# Patient Record
(demographics unavailable — no encounter records)

---

## 2022-07-31 LAB
INFLUENZA A: NOT DETECTED
INFLUENZA B: NOT DETECTED
SARS-COV-2: NOT DETECTED

## 2022-10-17 NOTE — DISCHARGE SUMMARY
ED Clinical Summary                         [facility] RESIDENTIAL TREATMENT FACILITY  [address]  [place], [place], [address] [phone]           PERSON INFORMATION  Name: Lyla Naranjo Age:  28 Years : 1987   Sex: Male Language: English PCP: PCP,  NONE   Marital Status:  Single Phone: [phone] Med Service: MED-Medicine   MRN:  [id number] Acct# [de-identified] Arrival: 2022 18:28:00   Visit Reason: Headache; HA Acuity: 3 LOS: 000 01:21   Address:      [address] [place] [id number]  Diagnosis:      Acute sinusitis; Head ache  Printed Prescriptions: Allergies      No Known Medication Allergies      Medications Administered During Visit:                  Medication Dose Route   butalbital/acetaminophen/caffeine 1 tabs Oral       Patient Medication List:              butalbital/acetaminophen/caffeine (butalbital/acetaminophen/caffeine 50 mg-325 mg-40 mg oral capsule) 1 Capsules Oral (given by mouth) every 8 hours as needed moderate pain (4-7) for 3 Days. Refills: 0.  oxymetazoline nasal (Afrin 0.05% nasal spray) 2 Sprays Nasal (into the nose) 2 times a day for 3 Days. Refills: 0. Major Tests and Procedures: The following procedures and tests were performed during your ED visit. COMMONPROCEDURES%>  COMMON PROCEDURESCOMMENTS%>          Laboratory Orders  Name Status Details   [facility] Ordered Nasopharyngeal Swab, Stat, ST - Stat, 22 19:32:00 EDT, 22 19:32:00 EDT, Nurse collect, Jed Marian E-PAC, Print label Y/N               Radiology Orders  No radiology orders were placed. Patient Care Orders  Name Status Details   COVID-19 Status Ordered 22 19:32:14 EDT, NOT VALID FOR pharmacy, laboratory, radiology. , 22 19:32:14 EDT, COVID-19 PUI - under investigation   Discharge Patient Ordered 22 19:34:00 EDT   ED Assessment Adult Completed 22 18:32:41 EDT, 22 18:32:41 EDT   ED Secondary Triage Completed 22 18:32:41 EDT, 07/31/22 18:32:41 EDT   ED Triage Adult Completed 07/31/22 18:28:11 EDT, 07/31/22 18:28:11 EDT   Notify Provider Completed 07/31/22 19:32:14 EDT, This message can only be seen by Nursing, it is not visible to Pharmacy, Laboratory, or Radiology. , 07/31/22 19:32:14 EDT   Patient Isolation Ordered 07/31/22 19:32:00 EDT, Contact and Airborne, Constant Indicator             PROVIDER INFORMATION               Provider Role Assigned [facility] ED MidLevel 7/31/2022 18:50:57    Edra Mate P-RN ED Nurse 7/31/2022 19:28:26        Attending Physician:  DEFAULT,  DOCTOR     Admit Doc  DEFAULT,  DOCTOR     Consulting Doc       VITALS INFORMATION  Vital Sign Triage Latest   Temp Oral ORAL_1%>36.7 degC ORAL%>36.8 degC   Temp Temporal TEMPORAL_1%> TEMPORAL%>   Temp Intravascular INTRAVASCULAR_1%> INTRAVASCULAR%>   Temp Axillary AXILLARY_1%> AXILLARY%>   Temp Rectal RECTAL_1%> RECTAL%>   02 Sat 100 % 100 %   Respiratory Rate RATE_1%>18 br/min RATE%>16 br/min   Peripheral Pulse Rate PULSE RATE_1%> PULSE RATE%>   Apical Heart Rate HEART RATE_1%> HEART RATE%>   Blood Pressure BLOOD PRESSURE_1%>/ BLOOD PRESSURE_1%>71 mmHg BLOOD PRESSURE%>124 mmHg / BLOOD PRESSURE%>71 mmHg                 Immunizations      No Immunizations Documented This Visit          DISCHARGE INFORMATION   Discharge Disposition: H Outpt-Sent Home   Discharge Location:    Home   Discharge Date and Time:    7/31/2022 19:49:00   ED Checkout Date and Time:    7/31/2022 19:49:00     DEPART REASON INCOMPLETE INFORMATION               Depart Action Incomplete Reason   Interactive View/I&O Recently assessed               Problems      Active           No Chronic Problems              Smoking Status      No Smoking Status Documented         PATIENT EDUCATION INFORMATION  Instructions:       Sinusitis, Adult, Easy-to-Read     Follow up:                    With: Address: When:   Primary care/ clinic  Within 3 to 5 days   Comments: Thank you so much for visiting with us today. You have had a screening emergency medical exam and to this point, no emergent condition has been identified. It is   important to realize that of course your visit today is simply a moment in time and that your medical condition can certainly change and bcome worse, necessitating re-   evaluation in an urgent or emergent manner. It is your responsibility to seek care in such instances. It is also your responsibility to follow up with your primary care   provider to discuss your Emergency Department visit and to go over any and all testing that was incurred during your Emergency Department visit. We have made you   aware of any pertinent results at this visit, however this may not have been comprehensive. Lack of an acute emergency condition does not mean that there is not a   problem, nor does it replace a thorough exam performed by a primary care physician. It is your responsibility to follow your discharge instructions as given, to follow up   with any other physicians as indicated, and to return to the Emergency Department as instructed. Thank you again for visiting with us today, please let us know if you   have any further questions. Please follow-up with your primary care provider over the next 3-5 days. Please return to the ER if symptoms change, worsen, or for any new concern. With: Address: When:   [address] [place] [address]  [phone] Business (1) Within 2 to 3 days   Comments:   Please follow-up with [facility] in 2 to 3 days for secondary evaluation. Take pain medication at home, and medications as prescribed.  Check your results through the patient portal for COVID-19 and influenza           ED PROVIDER DOCUMENTATION

## 2022-10-17 NOTE — ED NOTES
ED Patient Education Note     Patient Education Materials Follows: Infectious Disease     Sinusitis, Adult      Sinusitis is soreness and swelling (inflammation) of your sinuses. Sinuses are hollow spaces in the bones around your face. They are located:   Around your eyes. In the middle of your forehead. Behind your nose. In your cheekbones. Your sinuses and nasal passages are lined with a fluid called mucus. Mucus drains out of your sinuses. Swelling can trap mucus in your sinuses. This lets germs (bacteria, virus, or fungus) grow, which leads to infection. Most of the time, this condition is caused by a virus. What are the causes? This condition is caused by: Allergies. Asthma. Germs. Things that block your nose or sinuses. Growths in the nose (nasal polyps). Chemicals or irritants in the air. Fungus (rare). What increases the risk? You are more likely to develop this condition if:   You have a weak body defense system (immune system). You do a lot of swimming or diving. You use nasal sprays too much. You smoke. What are the signs or symptoms? The main symptoms of this condition are pain and a feeling of pressure around the sinuses. Other symptoms include:   Stuffy nose (congestion). Runny nose (drainage). Swelling and warmth in the sinuses. Headache. Toothache. A cough that may get worse at night. Mucus that collects in the throat or the back of the nose (postnasal drip). Being unable to smell and taste. Being very tired (fatigue). A fever. Sore throat. Bad breath. How is this diagnosed? This condition is diagnosed based on: Your symptoms. Your medical history. A physical exam.      Tests to find out if your condition is short-term (acute) or long-term (chronic). Your doctor may:  ? Check your nose for growths (polyps). ?  Check your sinuses using a tool that soap and water, use hand . Do not smoke. Avoid being around people who are smoking (secondhand smoke). Keep all follow-up visits as told by your doctor. This is important. Contact a doctor if:     You have a fever. Your symptoms get worse. Your symptoms do not get better within 10 days. Get help right away if:     You have a very bad headache. You cannot stop throwing up (vomiting). You have very bad pain or swelling around your face or eyes. You have trouble seeing. You feel confused. Your neck is stiff. You have trouble breathing. Summary     Sinusitis is swelling of your sinuses. Sinuses are hollow spaces in the bones around your face. This condition is caused by tissues in your nose that become inflamed or swollen. This traps germs. These can lead to infection. If you were prescribed an antibiotic medicine, take it as told by your doctor. Do not stop taking it even if you start to feel better. Keep all follow-up visits as told by your doctor. This is important. This information is not intended to replace advice given to you by your health care provider. Make sure you discuss any questions you have with your health care provider. Document Revised: 05/20/2019 Document Reviewed: 05/20/2019  Elsevier Patient Education ?  [address] [place].

## 2022-10-17 NOTE — ED NOTES
ED Patient Summary       ;          [facility]  [address], [place], [address] [place] [address]  [phone]  Discharge Instructions (Patient)  [employer]  :  1987                   MRN: [id number]                   FIN: [id number]  Reason For Visit: Headache; HA  Final Diagnosis: Acute sinusitis; Head ache     Visit Date: 2022 18:28:00  Address: [place] [address] [place] [id number]  Phone: [phone]     Emergency Department Providers:         Primary Physician:      [facility] would like to thank you for allowing us to assist you with your healthcare needs. The following includes patient education materials and information regarding your injury/illness. Follow-up Instructions: You were seen today on an emergency basis. Please contact your primary care doctor for a follow up appointment. If you received a referral to a specialist doctor, it is important you follow-up as instructed. It is important that you call your follow-up doctor to schedule and confirm the location of your next appointment. Your doctor may practice at multiple locations. The office location of your follow-up appointment may be different to the one written on your discharge instructions. If you do not have a primary care doctor, please call [phone] for help in finding a Chris Cordon. [facility] Provider. For help in finding a specialist doctor, please call [phone]. If your condition gets worse before your follow-up with your primary care doctor or specialist, please return to the Emergency Department. Coronavirus 2019 (COVID-19) Reminders:     Patients age 15 - 24, with parental consent, and over age 25 can make an appointment for a COVID-19 vaccine. Patients can contact their [facility] doctors' offices to schedule an appointment to receive the COVID-19 vaccine.  Patients who do not have a Chris Oka. through the patient portal for COVID-19 and influenza              [address] SERVICES%>             New Medications  Printed Prescriptions  butalbital/acetaminophen/caffeine (butalbital/acetaminophen/caffeine 50 mg-325 mg-40 mg oral capsule) 1 Capsules Oral (given by mouth) every 8 hours as needed moderate pain (4-7) for 3 Days. Refills: 0. Last Dose:____________________  oxymetazoline nasal (Afrin 0.05% nasal spray) 2 Sprays Nasal (into the nose) 2 times a day for 3 Days. Refills: 0. Last Dose:____________________      Allergy Info: No Known Medication Allergies     Discharge Additional Information          Discharge Patient 07/31/22 19:34:00 EDT      Patient Education Materials:        Sinusitis, Adult      Sinusitis is soreness and swelling (inflammation) of your sinuses. Sinuses are hollow spaces in the bones around your face. They are located:   Around your eyes. In the middle of your forehead. Behind your nose. In your cheekbones. Your sinuses and nasal passages are lined with a fluid called mucus. Mucus drains out of your sinuses. Swelling can trap mucus in your sinuses. This lets germs (bacteria, virus, or fungus) grow, which leads to infection. Most of the time, this condition is caused by a virus. What are the causes? This condition is caused by: Allergies. Asthma. Germs. Things that block your nose or sinuses. Growths in the nose (nasal polyps). Chemicals or irritants in the air. Fungus (rare). What increases the risk? You are more likely to develop this condition if:   You have a weak body defense system (immune system). You do a lot of swimming or diving. You use nasal sprays too much. You smoke. What are the signs or symptoms? The main symptoms of this condition are pain and a feeling of pressure around the sinuses. Other symptoms include:   Stuffy nose (congestion).      Runny nose (drainage). Swelling and warmth in the sinuses. Headache. Toothache. A cough that may get worse at night. Mucus that collects in the throat or the back of the nose (postnasal drip). Being unable to smell and taste. Being very tired (fatigue). A fever. Sore throat. Bad breath. How is this diagnosed? This condition is diagnosed based on: Your symptoms. Your medical history. A physical exam.      Tests to find out if your condition is short-term (acute) or long-term (chronic). Your doctor may:  ? Check your nose for growths (polyps). ? Check your sinuses using a tool that has a light (endoscope). ? Check for allergies or germs. ? Do imaging tests, such as an MRI or CT scan. How is this treated? Treatment for this condition depends on the cause and whether it is short-term or long-term. If caused by a virus, your symptoms should go away on their own within 10 days. You may be given medicines to relieve symptoms. They include:  ? Medicines that shrink swollen tissue in the nose. ? Medicines that treat allergies (antihistamines). ? A spray that treats swelling of the nostrils. ?    ? Rinses that help get rid of thick mucus in your nose (nasal saline washes). If caused by bacteria, your doctor may wait to see if you will get better without treatment. You may be given antibiotic medicine if you have:  ? A very bad infection. ? A weak body defense system. If caused by growths in the nose, you may need to have surgery. Follow these instructions at home:    Medicines     Take, use, or apply over-the-counter and prescription medicines only as told by your doctor. These may include nasal sprays. If you were prescribed an antibiotic medicine, take it as told by your doctor. Do not stop taking the antibiotic even if you start to feel better.       Hydrate and humidify       Drink enough water to keep your pee 05/20/2019  Elsevier Patient Education ? [facility]      ---------------------------------------------------------------------------------------------------------------------  [facility] allows patients to review your COVID and other test results as well as discharge documents from any [facility], Emergency Department, surgical center or outpatient lab. Test results are typically available 36 hours after the test is completed. [address] encourages you to self-enroll in the [facility] Patient Portal.     To begin your self-enrollment process, please visit [email or web address] Under [facility], click on Sign up now. NOTE: You must be 16 years and older to use [facility] Self-Enroll online. If you are a parent, caregiver, or guardian; you need an invite to access your childs or dependents health records. To obtain an invite, contact the Medical Records department at [phone] Monday through Friday, 8-4:30, select option 3 . If we receive your call afterhours, we will return your call the next business day. If you have issues trying to create or access your account, contact Present at [phone] available 7 days a week 24 hours a day.      Comment:

## 2022-10-17 NOTE — ED NOTES
ED Triage Note       ED Secondary Triage Entered On:  7/31/2022 19:49 EDT    Performed On:  7/31/2022 19:40 EDT by Reji HYATT               General Information   Barriers to Learning :   None evident   Languages :   English   COVID-19 Vaccine Status :   2 Doses received   ED Home Meds Section :   Document assessment   [facility] ED Fall Risk Section :   Document assessment   ED Advance Directives Section :   Document assessment   ED Palliative Screen :   N/A (prefilled for <64yo)   Reji HYATT - 7/31/2022 19:49 EDT   (As Of: 7/31/2022 19:49:33 EDT)   Problems(Active)    No Chronic Problems (Cerner  :NKP )  Name of Problem:   No Chronic Problems ; Recorder:   Viviana Ryan; Code:   NKP ; Last Updated:   1/13/2020 19:35 EST ; Life Cycle Date:   1/13/2020 ; Life Cycle Status:   Active ; Vocabulary:   Cerner          Diagnoses(Active)    Acute sinusitis  Date:   7/31/2022 ; Diagnosis Type:   Discharge ; Confirmation:   Confirmed ; Clinical Dx:   Acute sinusitis ; Classification:   Medical ; Clinical Service:   Non-Specified ; Code:   ICD-10-CM ; Probability:   0 ; Diagnosis Code:   J01.90      Head ache  Date:   7/31/2022 ; Diagnosis Type:   Discharge ; Confirmation:   Confirmed ; Clinical Dx:   Head ache ; Classification:   Medical ; Clinical Service:   Non-Specified ; Code:   ICD-10-CM ;  Probability:   0 ; Diagnosis Code:   R51.9      Headache  Date:   7/31/2022 ; Diagnosis Type:   Reason For Visit ; Confirmation:   Complaint of ; Clinical Dx:   Headache ; Classification:   Medical ; Clinical Service:   Emergency medicine ; Code:   PNED ; Probability:   0 ; Diagnosis Code:   [id number]             -    Procedure History   (As Of: 7/31/2022 19:49:33 EDT)     [facility] Fall Risk Assessment Tool   Hx of falling last 3 months ED Fall :   No   Patient confused or disoriented ED Fall :   No   Patient intoxicated or sedated ED Fall :   No   Patient impaired gait ED Fall :   No   Use a mobility assistance device ED Fall :   No   Patient altered elimination ED Fall :   No   [facility] ED Fall Score :   0    Roula HYATT - 7/31/2022 19:49 EDT   ED Advance Directive   Advance Directive :   No   Roula HYATT - 7/31/2022 19:49 EDT   Med Hx   Medication List   (As Of: 7/31/2022 19:49:33 EDT)   Normal Order    acetaminophen/butalbital/caffeine 325 mg-50 mg-40 mg Tab  :   acetaminophen/butalbital/caffeine 325 mg-50 mg-40 mg Tab ; Status:   Completed ; Ordered As Mnemonic:   Fioricet oral tablet ; Simple Display Line:   1 tabs, Oral, Once ; Ordering Provider:   Kings Rodriguez; Catalog Code:   butalbital/acetaminophen/caffeine ; Order Dt/Tm:   7/31/2022 19:28:00 EDT            Prescription/Discharge Order    butalbital/acetaminophen/caffeine  :   butalbital/acetaminophen/caffeine ; Status:   Prescribed ; Ordered As Mnemonic:   butalbital/acetaminophen/caffeine 50 mg-325 mg-40 mg oral capsule ; Simple Display Line:   1 caps, Oral, q8hr, for 3 days, PRN: moderate pain (4-7), 12 caps, 0 Refill(s) ; Ordering Provider:   Emily ROMEO; Catalog Code:   butalbital/acetaminophen/caffeine ; Order Dt/Tm:   7/31/2022 19:33:08 EDT          oxymetazoline nasal  :   oxymetazoline nasal ; Status:   Prescribed ; Ordered As Mnemonic:   Afrin 0.05% nasal spray ; Simple Display Line:   2 sprays, Nasal, BID, for 3 days, 30 mL, 0 Refill(s) ;  Ordering Provider:   Emily ROMEO; Catalog Code:   oxymetazoline nasal ; Order Dt/Tm:   7/31/2022 19:32:59 EDT

## 2022-10-17 NOTE — ED NOTES
ED Triage Note       ED Triage Adult Entered On:  7/31/2022 18:32 EDT    Performed On:  7/31/2022 18:29 EDT by Leta CELAYA               Triage   Numeric Rating Pain Scale :   9   Chief Complaint :   c/o headache since 7/29, relief goody powder &tylenol but \"they keep coming back\"  hx of migraines, denies blurred/double vision, denies n/v/d, tylenol 1100, ambulatory   [place] [facility] Mode of Arrival :   Private vehicle   Infectious Disease Documentation :   Document assessment   Temperature Oral :   36.7 degC(Converted to: 98.1 degF)    Heart Rate Monitored :   87 bpm   Respiratory Rate :   18 br/min   Systolic Blood Pressure :   498 mmHg   Diastolic Blood Pressure :   71 mmHg   SpO2 :   100 %   Oxygen Therapy :   Room air   Patient presentation :   None of the above   Chief Complaint or Presentation suggest infection :   No   Weight Dosing :   81.6 kg(Converted to: 179 lb 14 oz)    Height :   180 cm(Converted to: 5 ft 11 in)    Body Mass Index Dosing :   25 kg/m2   Leta CELAYA - 7/31/2022 18:29 EDT   DCP GENERIC CODE   Tracking Acuity :   3   Tracking Group :   ED [facility] Tracking [facility]   Leta CELAYA - 7/31/2022 18:29 EDT   ED General Section :   Document assessment   Pregnancy Status :   N/A   ED Allergies Section :   Document assessment   ED Reason for Visit Section :   Document assessment   ED Quick Assessment :   Patient appears awake, alert, oriented to baseline. Skin warm and dry. Moves all extremities. Respiration even and unlabored. Appears in no apparent distress.    Leta CELAYA - 7/31/2022 18:29 EDT   ID Risk Screen Symptoms   Recent Travel History :   No recent travel   Last 90 days COVID-19 ID :   No   Close Contact with COVID-19 ID :   No   Last 14 days COVID-19 ID :   Yes - Not Detected (negative)   Helena Sy - 7/31/2022 18:29 EDT   Allergies   (As Of: 7/31/2022 18:32:41 EDT)   Allergies (Active)   No Known Medication Allergies  Estimated Onset Date: Unspecified ; Created By:   Debbie Alvarez RN, Crystal; Reaction Status:   Active ; Category:   Drug ; Substance:   No Known Medication Allergies ; Type: Allergy ; Updated By:   Ledy Graff; Reviewed Date:   7/31/2022 18:31 EDT        Psycho-Social   Last 3 mo, thoughts killing self/others :   Patient denies   Right click within box for Suspected Abuse policy link. :   None   Feels Safe Where Live :   Yes   ED Behavioral Activity Rating Scale :   4 - Quiet and awake (normal level of activity)   Dandre CELAYA - 7/31/2022 18:29 EDT   ED Reason for Visit   (As Of: 7/31/2022 18:32:41 EDT)   Problems(Active)    No Chronic Problems (Cerner  :NKP )  Name of Problem:   No Chronic Problems ; Recorder:   Roxan Heimlich; Code:   NKP ; Last Updated:   1/13/2020 19:35 EST ;  Life Cycle Date:   1/13/2020 ; Life Cycle Status:   Active ; Vocabulary:   Cerner          Diagnoses(Active)    Headache  Date:   7/31/2022 ; Diagnosis Type:   Reason For Visit ; Confirmation:   Complaint of ; Clinical Dx:   Headache ; Classification:   Medical ; Clinical Service:   Emergency medicine ; Code:   PNED ; Probability:   0 ; Diagnosis Code:   [id number]

## 2022-10-17 NOTE — ED PROVIDER NOTES
Headache        Patient:   Guzman German             MRN: [id number]            FIN: [id number]               Age:   28 years     Sex:  Male     :  1987   Associated Diagnoses:   Acute sinusitis; Head ache   Author:   Kevan ROMEO      Basic Information   Time seen: Provider Seen ()   ED Provider/Time:    Kevan ROMEO / 2022 18:50  . Additional information: Chief Complaint from Nursing Triage Note   Chief Complaint  Chief Complaint: c/o headache since , relief goody powder &tylenol but \"they keep coming back\"  hx of migraines, denies blurred/double vision, denies n/v/d, tylenol 1100, ambulatory (22 18:29:00). History of Present Illness   54-year-old male presents here today for evaluation due to bilateral frontal headache ongoing with a cough, and nasal congestion. States with coughing he really feels nasal congestion, and pressure in the frontal headache. He states he has taken Tylenol at home initial relief of his headache at home, however it does return. Does endorse occasional headaches. He states that his headache over the last 3 days started gradually, is not's with exertion, or position he states that has been relieved by Alan Husbands powders, and Tylenol. No visual changes, lightheadedness, dizziness, nausea, vomiting chest pain, shortness of breath, tingling or numbness. No past medical history. .        Review of Systems   Constitutional symptoms:  Negative except as documented in HPI. ENMT symptoms:  Nasal congestion. Respiratory symptoms:  Cough, No shortness of breath,    Cardiovascular symptoms:  Negative except as documented in HPI. Neurologic symptoms:  Headache, no dizziness, no altered level of consciousness, no numbness, no tingling, no focal weakness. Additional review of systems information: All other systems reviewed and otherwise negative. Health Status   Allergies:     Allergic Reactions (Selected)  No Known Medication department nurses' notes, flowsheet, emergency department records, prior records, vital signs. Results review:  Lab results : Lab View   7/31/2022 19:41 EDT COVID (SARS-CoV-2) (IVA) Not Detected    Influenza A (IVA) Not Detected    Influenza B (IVA) Not Detected   7/31/2022 18:32 EDT Estimated Creatinine Clearance 99.42 mL/min   . Notes:  27-year-old male presents the ER today for 3 days of gradual headache possible URI symptoms. Physical exam shows a patient in no acute distress, non-ill-appearing, nontoxic-appearing, stable vital signs. Neurologically intact. Patient is tested for COVID-19. He is discharged prior to results. He is treated for his headache in the ER with some improvement. He is discharged home with close follow-up with primary care and neurology. Return to the ER if symptoms change or worsen. .      Impression and Plan   Diagnosis   Acute sinusitis (SQI90-EA J01.90, Discharge, Medical)   Head ache (MMH30-AS R51.9, Discharge, Medical)   Plan   Condition: Stable. Disposition: Discharged: to home. Prescriptions: [facility] prescriptions   Pharmacy:  butalbital/acetaminophen/caffeine 50 mg-325 mg-40 mg oral capsule (Prescribe): 1 caps, Oral, q8hr, for 3 days, PRN: moderate pain (4-7), 12 caps, 0 Refill(s)  Afrin 0.05% nasal spray (Prescribe): 2 sprays, Nasal, BID, for 3 days, 30 mL, 0 Refill(s). Patient was given the following educational materials: Sinusitis, Adult, Easy-to-Read. Follow up with: [phone] Within 2 to 3 days Please follow-up with [facility] in 2 to 3 days for secondary evaluation. Take pain medication at home, and medications as prescribed. Check your results through the patient portal for COVID-19 and influenza; Primary care/ clinic Within 3 to 5 days Thank you so much for visiting with us today. You have had a screening emergency medical exam and to this point, no emergent condition has been identified.   It is  important to realize that of course your visit today is simply a moment in time and that your medical condition can certainly change and bcome worse, necessitating re-  evaluation in an urgent or emergent manner. It is your responsibility to seek care in such instances. It is also your responsibility to follow up with your primary care   provider to discuss your Emergency Department visit and to go over any and all testing that was incurred during your Emergency Department visit. We have made you   aware of any pertinent results at this visit, however this may not have been comprehensive. Lack of an acute emergency condition does not mean that there is not a   problem, nor does it replace a thorough exam performed by a primary care physician. It is your responsibility to follow your discharge instructions as given, to follow up   with any other physicians as indicated, and to return to the Emergency Department as instructed. Thank you again for visiting with us today, please let us know if you   have any further questions. Please follow-up with your primary care provider over the next 3-5 days. Please return to the ER if symptoms change, worsen, or for any new concern. .    Counseled: Patient, Regarding diagnosis, Regarding diagnostic results, Regarding treatment plan, Patient indicated understanding of instructions.     Signature Line     Electronically Signed on 08/01/2022 12:12 AM EDT   ________________________________________________   Ann GODFREY-PAC      Electronically Signed on 08/16/2022 08:24 AM EDT   ________________________________________________   RICK Ledezma            Modified by: Ann GODFREY-PAC on 08/01/2022 12:12 AM EDT